# Patient Record
(demographics unavailable — no encounter records)

---

## 2024-10-31 NOTE — DISCUSSION/SUMMARY
[de-identified] : 65-year-old female with 1 month left hip pain following yoga.  Her symptoms may be secondary to labral tear and trochanteric bursitis.  I recommended relative rest, ice or heat, stretching exercises, Tylenol or NSAIDs as needed.  She was given a hip conditioning packet.  Meloxicam was prescribed, side effects reviewed.  Follow-up 6 weeks

## 2024-10-31 NOTE — PHYSICAL EXAM
[Normal] : Gait: normal [de-identified] : General: No acute distress Mental: Alert and oriented x3 Eyes: Conjunctivitis not seen Chest: Symmetric chest rise, no audible wheezing Skin: Bilateral lower extremities absent from rashes and ulcers Abdomen: No distention  Left hip: Skin: Clean, dry and intact Inspection: No obvious deformity, no swelling, no ecchymosis. Tenderness: Positive tenderness over greater trochanter/gluteus medius insertion. No tenderness pubic symphysis, pubic tubercle, hip flexors. No tenderness ischial tuberosity or buttock. Nontender over the ASIS/Illiac crest. ROM: 0-120. Internal rotation 40, external rotation 70 Special tests: negative Stinchfield, positive FADIR, positive JOSH, negative logroll Additional tests: No pain with circumduction, negative impingement test at 90 Strength: 5/5 hip flexion/Abduction/Q/H/TA/GS/EHL Neuro: Sensation intact to light touch throughout in dp/sp/tib/polina/saph distributions Pulses: 2+ DP/PT pulses [de-identified] : Pelvis and left hip x-rays today shows appropriate alignment, well-maintained joint spaces, no fracture.

## 2025-01-30 NOTE — PHYSICAL EXAM
[Normal] : Gait: normal [de-identified] : General: No acute distress Mental: Alert and oriented x3 Eyes: Conjunctivitis not seen Chest: Symmetric chest rise, no audible wheezing Skin: Bilateral lower extremities absent from rashes and ulcers Abdomen: No distention  Left hip: Skin: Clean, dry and intact Inspection: No obvious deformity, no swelling, no ecchymosis. Tenderness: Positive tenderness over greater trochanter/gluteus medius insertion. No tenderness pubic symphysis, pubic tubercle, hip flexors. No tenderness ischial tuberosity or buttock. Nontender over the ASIS/Illiac crest. ROM: 0-120. Internal rotation 40, external rotation 70 Special tests: negative Stinchfield, positive FADIR, negative JOSH, negative logroll Additional tests: No pain with circumduction, negative impingement test at 90 Strength: 5/5 hip flexion/Abduction/Q/H/TA/GS/EHL Neuro: Sensation intact to light touch throughout in dp/sp/tib/polina/saph distributions Pulses: 2+ DP/PT pulses [de-identified] : MRI left hip at [facility] on 1/10/25 shows labral tear, mild hip OA, gluteus tendinosis. MRI Lumbar spine at [facility] on 1/10/25 shows disc bulge, mild nerve stenosis of L5.

## 2025-01-30 NOTE — HISTORY OF PRESENT ILLNESS
[de-identified] : Reports mildly improved left hip pain. She took meloxicam for 1 week and then stopped due to upset stomach. She has been doing stretching, and hasn't started PT yet. Pain is along the lateral and anterior aspect, worse with prolonged walking. Pain radiates to her low back. She no longer has catching sensations. There is no pain down the leg and no numbness or tingling. She has trouble getting into a car.

## 2025-01-30 NOTE — DISCUSSION/SUMMARY
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 She is not having lumbar radiculopathy symptoms. She has mildly improved pain since last visit. We discussed continued management including activity modification with low impact exercise, PRN use of acetaminophen or anti-inflammatory medication if tolerated, glucosamine/chondroitin supplements, and physical therapy. Further treatments can include corticosteroid injection. She will start PT, followup 3 months.

## 2025-03-13 NOTE — REASON FOR VISIT
[Initial Consultation] : an initial consultation for [Spouse] : spouse [FreeTextEntry2] : Right parotid mass

## 2025-03-13 NOTE — PLAN
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 Patient was told that she had multiple nodules and they were benign.  - Recently she found a small pre auricular nodule and had a MRI which showed a 4 x 6 mm subcutaneouds nodule most compatible with a LN. - US in the office today showed a 5 mm hypoechoic nodule in the subcutaneous tissue near the R zygomatic arch.  - Will request path report from previous surgery. - Return in 3 months to confirm stability of the findings.

## 2025-03-13 NOTE — CONSULT LETTER
[Dear  ___] : Dear  [unfilled], [Consult Letter:] : I had the pleasure of evaluating your patient, [unfilled]. [Please see my note below.] : Please see my note below. [Consult Closing:] : Thank you very much for allowing me to participate in the care of this patient.  If you have any questions, please do not hesitate to contact me. [Sincerely,] : Sincerely, [FreeTextEntry2] : Familia Serrano MD [FreeTextEntry3] : Kwesi Hernandez MD, FACS  Otolaryngology-Head and Neck Surgery [facility] [employer] [facility]

## 2025-03-13 NOTE — HISTORY OF PRESENT ILLNESS
[de-identified] : 65 year old woman referred by Dr. Tom Serrano for nodule in the right preauricular region, superior to the zygoma that was first noticed by patient in 11/2024. H/o Right parotidectomy in 1996 done by Dr. Tai Villa  Reports no changes in size and firm to the touch. Patient denies facial numbness/ tingling, globus sensation, dysphagia, odynophagia, dyspnea, dysphonia, hemoptysis or otalgia. Denies recent fevers/infections, chills, night sweats, unintentional weight loss.  Never a smoker. Consumes alcohol consumption.  MRI SOFT TISSUE NECK w/wo Contrast 2/13/2025 MPRESSION: 1.   Subjacent to the markers placed on the right preauricular, supra zygomatic region is a 4.1 x 4.2 x 6.4 mm well-circumscribed nodule with homogeneous enhancement, most compatible with a lymph node. This is separate from the remaining right parotid tissue, which is of normal morphology and signal intensity.  2.  There is no lymphadenopathy in the neck.  3.  Near complete opacification of left sphenoid compartment.  4.  1 cm left maxillary mucous retention cyst.

## 2025-03-17 NOTE — END OF VISIT
[TextEntry] : IKerry, am scribing for and the presence of Dr. Anjana Henry the following sections HISTORY OF PRESENT ILLNESS, PAST MEDICAL/FAMILY/SOCIAL HISTORY; REVIEW OF SYSTEMS; PHYSICAL EXAM; DISPOSITION.

## 2025-03-17 NOTE — PHYSICAL EXAM
[Awake] : awake [Alert] : alert [Soft] : soft [Oriented x3] : oriented to person, place, and time [Normal] : uterus [Atrophy] : atrophy [Uterine Adnexae] : were not tender and not enlarged [Acute Distress] : no acute distress [Mass] : no breast mass [Nipple Discharge] : no nipple discharge [Axillary LAD] : no axillary lymphadenopathy [Tender] : non tender [FreeTextEntry5] : was stenotic